# Patient Record
Sex: FEMALE | Race: WHITE | Employment: OTHER | URBAN - METROPOLITAN AREA
[De-identification: names, ages, dates, MRNs, and addresses within clinical notes are randomized per-mention and may not be internally consistent; named-entity substitution may affect disease eponyms.]

---

## 2024-07-25 ENCOUNTER — HOSPITAL ENCOUNTER (OUTPATIENT)
Age: 64
Discharge: HOME | End: 2024-07-25
Payer: COMMERCIAL

## 2024-07-25 VITALS
HEART RATE: 66 BPM | DIASTOLIC BLOOD PRESSURE: 82 MMHG | RESPIRATION RATE: 18 BRPM | SYSTOLIC BLOOD PRESSURE: 121 MMHG | HEIGHT: 61 IN | TEMPERATURE: 98 F | BODY MASS INDEX: 22.66 KG/M2 | OXYGEN SATURATION: 98 % | WEIGHT: 120 LBS

## 2024-07-25 DIAGNOSIS — K59.00 CONSTIPATION, UNSPECIFIED CONSTIPATION TYPE: Primary | ICD-10-CM

## 2024-07-25 PROCEDURE — 99203 OFFICE O/P NEW LOW 30 MIN: CPT | Performed by: NURSE PRACTITIONER

## 2024-07-25 NOTE — ED INITIAL ASSESSMENT (HPI)
Pt states she is currently dealing with constipation. Feeling bloated and not passing her normal amount of stool. Pt states she is passing soft, thin stool but still feels bloated and full. Hx of constipation. Contributing factors are travel, stress, and not drinking as much water.   Pt did use suppositories 2 days ago and felt as though it help with the stool in the distal colon but thinks she may still be full of stool. +passing gas.

## 2024-07-25 NOTE — DISCHARGE INSTRUCTIONS
Push fluids and increase fiber intake.  Increase fruits and vegetables.  May also use over-the-counter supplement such as Benefiber or fiber 1 bar.  Give stool softener such as MiraLAX or Colace to help bowel movements easier.  If do not start having normal bowel movements after 3 days increase to 2 doses a day.  If continue to have difficulties with bowel movements may try magnesium citrate or other oral laxative.  If still unsuccessful use a glycerin suppository.  If this still does not work may use fleets enema over-the-counter.  If continue to have issues with constipation, uncontrolled pain, or vomiting go to the ER.

## 2024-07-25 NOTE — ED PROVIDER NOTES
Patient Seen in: [facility]      History     Chief Complaint   Patient presents with    Constipation     Stated Complaint: bloated, cramping, constipated    Subjective:   64-year-old female presents today with complaints of constipation and feelings of bloating.  Has taken over-the-counter oral laxative and done a fleets enema but states still having only small amounts of stool and is hard.  Has felt some generalized bloating and feeling gassy.  Is able to pass gas.  Denies any history of abdominal surgeries or GI issues in the past.  Patient states recently has been under increased stress and schedule has not been normal for her due to moving.  Patient is alert oriented x 3.  Denies any nausea vomiting.  No abdominal pain at this time.  No other symptoms or concerns.  The patient's medication list, past medical history and social history elements as listed in today's nurse's notes were reviewed and agreed (except as otherwise stated in the HPI).  The patient's family history reviewed and determined to be noncontributory to the presenting problem            Objective:   Past Medical History:    Allergic rhinitis, cause unspecified    ANXIETY    Chest pain, unspecified    Left sided rib pain    DEPRESSION    Unspecified asthma(493.90)              Past Surgical History:   Procedure Laterality Date    Other surgical history      BREAST AUGMENTATION                No pertinent social history.            Review of Systems    Positive for stated Chief Complaint: Constipation    Other systems are as noted in HPI.  Constitutional and vital signs reviewed.      All other systems reviewed and negative except as noted above.    Physical Exam     ED Triage Vitals [07/25/24 0904]   /82   Pulse 66   Resp 18   Temp 97.5 °F (36.4 °C)   Temp src Temporal   SpO2 98 %   O2 Device None (Room air)       Current Vitals:   Vital Signs  BP: 121/82  Pulse: 66  Resp: 18  Temp: 97.5 °F (36.4 °C)  Temp src:  Temporal    Oxygen Therapy  SpO2: 98 %  O2 Device: None (Room air)            Physical Exam  Vitals and nursing note reviewed.   Constitutional:       Appearance: Normal appearance.   HENT:      Head: Normocephalic.      Mouth/Throat:      Mouth: Mucous membranes are moist.   Cardiovascular:      Rate and Rhythm: Normal rate.   Pulmonary:      Effort: Pulmonary effort is normal.   Abdominal:      General: Bowel sounds are decreased.      Tenderness: There is no abdominal tenderness.   Musculoskeletal:      Cervical back: Normal range of motion and neck supple.   Neurological:      Mental Status: She is alert.               ED Course   Labs Reviewed - No data to display                   MDM     Please note that this report has been produced using speech recognition software and may contain errors related to that system including, but not limited to, errors in grammar, punctuation, and spelling, as well as words and phrases that possibly may have been recognized inappropriately.  If there are any questions or concerns, contact the dictating provider for clarification.                                         Medical Decision Making  Differential diagnosis includes but is not limited to: Appendicitis, colitis, diverticulitis, enteritis, obstruction, constipation      Presents today with history of decreased bowel movements and feelings of bloating.  Has tried laxative as well as fleets enema with minimal relief.  Denies any abdominal pain at this time.  No nausea vomiting.  No fever or chills.  Instructed patient to start with MiraLAX once a day for the next 3 days if still not having normal bowel movements may increase this to twice a day for 3 to 4 days.  If still not having bowel movements may take an oral laxative to include magnesium citrate or Dulcolax.  If still not having bowel movements may then try a suppository and then possible fleets enema.  Patient fell with her primary care physician 1 week if symptoms  do not improve.  Instructed go directly to the ER with any inability to pass gas or stool, worsening or localized abdominal pain, vomiting, fever, or any worsening symptoms.  Patient verbalized understanding and agreed to plan of care.    Risk  OTC drugs.        Disposition and Plan     Clinical Impression:  1. Constipation, unspecified constipation type         Disposition:  Discharge  7/25/2024  9:29 am    Follow-up:  Melissa Ross MD  [address] [facility] [address]  [phone]    In 1 week  As needed          Medications Prescribed:  Current Discharge Medication List